# Patient Record
Sex: MALE | Race: WHITE | NOT HISPANIC OR LATINO | Employment: FULL TIME
[De-identification: names, ages, dates, MRNs, and addresses within clinical notes are randomized per-mention and may not be internally consistent; named-entity substitution may affect disease eponyms.]

---

## 2018-04-15 ENCOUNTER — CHARTING TRANS (OUTPATIENT)
Dept: OTHER | Age: 43
End: 2018-04-15

## 2018-04-22 ENCOUNTER — HOSPITAL ENCOUNTER (OUTPATIENT)
Age: 43
Discharge: HOME | End: 2018-04-22
Attending: EMERGENCY MEDICINE
Payer: COMMERCIAL

## 2018-04-22 VITALS
WEIGHT: 200 LBS | TEMPERATURE: 98 F | OXYGEN SATURATION: 98 % | HEART RATE: 57 BPM | HEIGHT: 73 IN | BODY MASS INDEX: 26.51 KG/M2 | DIASTOLIC BLOOD PRESSURE: 96 MMHG | SYSTOLIC BLOOD PRESSURE: 149 MMHG | RESPIRATION RATE: 18 BRPM

## 2018-04-22 DIAGNOSIS — H91.92 DECREASED HEARING OF LEFT EAR: Primary | ICD-10-CM

## 2018-04-22 PROCEDURE — 99202 OFFICE O/P NEW SF 15 MIN: CPT

## 2018-04-22 RX ORDER — AMOXICILLIN 875 MG/1
875 TABLET, COATED ORAL 2 TIMES DAILY
COMMUNITY

## 2018-04-22 NOTE — ED INITIAL ASSESSMENT (HPI)
Pt here with complaints of left ear discomfort, pt states he trouble hearing out of the left ear, pt was diagnosed with a left ear infection last week and has been taking antibiotics for it , pt states the pain is better but hearing has reduced a lot

## 2018-04-22 NOTE — ED PROVIDER NOTES
Patient Seen in: [address]    History   Patient presents with:  Ear Problem Pain (neurosensory)    Stated Complaint: Ear problem    HPI    The patient is a 81-year-old male recently diagnosed with \"ear infection who pr murmur  Abdomen: Soft, nontender and nondistended  Neurologic: Patient is awake, alert and oriented ×3.   The patient's motor strength is 5 out of 5 and symmetric in the upper and lower extremities bilaterally  Extremities: No focal swelling or tenderness

## 2018-11-01 VITALS
SYSTOLIC BLOOD PRESSURE: 140 MMHG | HEART RATE: 85 BPM | WEIGHT: 200 LBS | HEIGHT: 73 IN | BODY MASS INDEX: 26.51 KG/M2 | DIASTOLIC BLOOD PRESSURE: 80 MMHG | RESPIRATION RATE: 18 BRPM | OXYGEN SATURATION: 97 %

## 2019-03-30 ENCOUNTER — WALK IN (OUTPATIENT)
Dept: URGENT CARE | Age: 44
End: 2019-03-30

## 2019-03-30 ENCOUNTER — TELEPHONE (OUTPATIENT)
Dept: SCHEDULING | Age: 44
End: 2019-03-30

## 2019-03-30 VITALS
TEMPERATURE: 97.8 F | HEART RATE: 65 BPM | OXYGEN SATURATION: 96 % | SYSTOLIC BLOOD PRESSURE: 135 MMHG | RESPIRATION RATE: 16 BRPM | DIASTOLIC BLOOD PRESSURE: 85 MMHG

## 2019-03-30 DIAGNOSIS — H66.92 LEFT OTITIS MEDIA, UNSPECIFIED OTITIS MEDIA TYPE: ICD-10-CM

## 2019-03-30 DIAGNOSIS — J06.9 VIRAL UPPER RESPIRATORY TRACT INFECTION WITH COUGH: Primary | ICD-10-CM

## 2019-03-30 PROCEDURE — 99214 OFFICE O/P EST MOD 30 MIN: CPT | Performed by: NURSE PRACTITIONER

## 2019-03-30 RX ORDER — AZITHROMYCIN 250 MG/1
TABLET, FILM COATED ORAL
Qty: 6 TABLET | Refills: 0 | Status: SHIPPED | OUTPATIENT
Start: 2019-03-30 | End: 2023-08-28 | Stop reason: ALTCHOICE

## 2019-03-30 RX ORDER — FLUTICASONE PROPIONATE 50 MCG
2 SPRAY, SUSPENSION (ML) NASAL DAILY
Qty: 16 G | Refills: 12 | Status: SHIPPED | OUTPATIENT
Start: 2019-03-30 | End: 2023-08-28 | Stop reason: ALTCHOICE

## 2019-03-30 ASSESSMENT — ENCOUNTER SYMPTOMS
CHEST TIGHTNESS: 0
PSYCHIATRIC NEGATIVE: 1
DIZZINESS: 0
ENDOCRINE NEGATIVE: 1
HEADACHES: 1
SORE THROAT: 0
FATIGUE: 1
GASTROINTESTINAL NEGATIVE: 1
LIGHT-HEADEDNESS: 0
SHORTNESS OF BREATH: 0
SINUS PRESSURE: 0
SINUS PAIN: 0
WHEEZING: 0
ACTIVITY CHANGE: 0
FEVER: 1
EYES NEGATIVE: 1
COUGH: 1
RHINORRHEA: 1
APPETITE CHANGE: 0
ALLERGIC/IMMUNOLOGIC NEGATIVE: 1
CHILLS: 1
HEMATOLOGIC/LYMPHATIC NEGATIVE: 1

## 2019-10-31 ENCOUNTER — WALK IN (OUTPATIENT)
Dept: URGENT CARE | Age: 44
End: 2019-10-31

## 2019-10-31 DIAGNOSIS — R05.8 POST-VIRAL COUGH SYNDROME: Primary | ICD-10-CM

## 2019-10-31 PROCEDURE — 99214 OFFICE O/P EST MOD 30 MIN: CPT | Performed by: NURSE PRACTITIONER

## 2019-10-31 RX ORDER — BENZONATATE 100 MG/1
100 CAPSULE ORAL 3 TIMES DAILY PRN
Qty: 20 CAPSULE | Refills: 0 | Status: SHIPPED | OUTPATIENT
Start: 2019-10-31 | End: 2023-08-28 | Stop reason: ALTCHOICE

## 2019-10-31 SDOH — HEALTH STABILITY: MENTAL HEALTH: HOW OFTEN DO YOU HAVE A DRINK CONTAINING ALCOHOL?: NEVER

## 2019-10-31 ASSESSMENT — ENCOUNTER SYMPTOMS
ABDOMINAL PAIN: 0
COUGH: 1
SORE THROAT: 0
VOMITING: 0
FATIGUE: 0
NAUSEA: 0
SINUS PRESSURE: 0
SWOLLEN GLANDS: 0
CHILLS: 0
WHEEZING: 0
DIARRHEA: 0
EYES NEGATIVE: 1
DIZZINESS: 0
DIAPHORESIS: 0
FEVER: 0
SHORTNESS OF BREATH: 0
SINUS PAIN: 0
CHEST TIGHTNESS: 0
HEADACHES: 0
STRIDOR: 0
RHINORRHEA: 0

## 2019-10-31 ASSESSMENT — PAIN SCALES - GENERAL: PAINLEVEL: 0

## 2019-12-29 ENCOUNTER — TELEPHONE (OUTPATIENT)
Dept: SCHEDULING | Age: 44
End: 2019-12-29

## 2021-05-26 VITALS
TEMPERATURE: 97.7 F | DIASTOLIC BLOOD PRESSURE: 90 MMHG | BODY MASS INDEX: 28.44 KG/M2 | WEIGHT: 210 LBS | HEIGHT: 72 IN | HEART RATE: 55 BPM | RESPIRATION RATE: 16 BRPM | OXYGEN SATURATION: 99 % | SYSTOLIC BLOOD PRESSURE: 140 MMHG

## 2023-08-28 ENCOUNTER — WALK IN (OUTPATIENT)
Dept: URGENT CARE | Age: 48
End: 2023-08-28

## 2023-08-28 VITALS
SYSTOLIC BLOOD PRESSURE: 132 MMHG | RESPIRATION RATE: 16 BRPM | BODY MASS INDEX: 30.44 KG/M2 | TEMPERATURE: 98.4 F | WEIGHT: 224.76 LBS | DIASTOLIC BLOOD PRESSURE: 74 MMHG | HEART RATE: 63 BPM | OXYGEN SATURATION: 98 % | HEIGHT: 72 IN

## 2023-08-28 DIAGNOSIS — L02.511 ABSCESS OF FINGER OF RIGHT HAND: ICD-10-CM

## 2023-08-28 DIAGNOSIS — L03.011 PARONYCHIA OF FINGER OF RIGHT HAND: Primary | ICD-10-CM

## 2023-08-28 PROCEDURE — 99213 OFFICE O/P EST LOW 20 MIN: CPT | Performed by: NURSE PRACTITIONER

## 2023-08-28 PROCEDURE — 10060 I&D ABSCESS SIMPLE/SINGLE: CPT | Performed by: NURSE PRACTITIONER

## 2023-08-28 RX ORDER — SULFAMETHOXAZOLE AND TRIMETHOPRIM 800; 160 MG/1; MG/1
1 TABLET ORAL 2 TIMES DAILY
Qty: 6 TABLET | Refills: 0 | Status: SHIPPED | OUTPATIENT
Start: 2023-08-28 | End: 2023-08-31

## 2023-08-28 ASSESSMENT — ENCOUNTER SYMPTOMS
FATIGUE: 0
DIAPHORESIS: 0
RESPIRATORY NEGATIVE: 1
FEVER: 0

## 2024-08-16 ENCOUNTER — TELEPHONE (OUTPATIENT)
Dept: OTHER | Age: 49
End: 2024-08-16

## 2024-08-16 ENCOUNTER — APPOINTMENT (OUTPATIENT)
Dept: URGENT CARE | Age: 49
End: 2024-08-16